# Patient Record
Sex: FEMALE | Race: BLACK OR AFRICAN AMERICAN | NOT HISPANIC OR LATINO | URBAN - METROPOLITAN AREA
[De-identification: names, ages, dates, MRNs, and addresses within clinical notes are randomized per-mention and may not be internally consistent; named-entity substitution may affect disease eponyms.]

---

## 2020-07-25 ENCOUNTER — TELEPHONE ENCOUNTER (OUTPATIENT)
Dept: URBAN - METROPOLITAN AREA CLINIC 13 | Facility: CLINIC | Age: 65
End: 2020-07-25

## 2020-07-25 RX ORDER — POLYETHYLENE GLYCOL 3350, SODIUM CHLORIDE, SODIUM BICARBONATE AND POTASSIUM CHLORIDE WITH LEMON FLAVOR 420; 11.2; 5.72; 1.48 G/4L; G/4L; G/4L; G/4L
USE AS DIRECTED POWDER, FOR SOLUTION ORAL
Qty: 1 | Refills: 0 | OUTPATIENT
Start: 2012-12-28 | End: 2013-01-30

## 2020-07-26 ENCOUNTER — TELEPHONE ENCOUNTER (OUTPATIENT)
Dept: URBAN - METROPOLITAN AREA CLINIC 13 | Facility: CLINIC | Age: 65
End: 2020-07-26

## 2020-07-26 RX ORDER — ASCORBIC ACID 500 MG
TAKE 3 TABLET WEEKLY.PT STATES DOSAGE 500MG TABLET ORAL
Refills: 0 | Status: ACTIVE | COMMUNITY

## 2020-07-26 RX ORDER — NAPROXEN SODIUM 220 MG
TAKE 1 TABLET 3 TIMES DAILY AS NEEDED.PT STATES DOSAGE 225MG TABLET ORAL
Refills: 0 | Status: ACTIVE | COMMUNITY

## 2020-07-26 RX ORDER — MELOXICAM 15 MG/1
TAKE 1 TABLET DAILY TABLET ORAL
Refills: 0 | Status: ACTIVE | COMMUNITY

## 2023-03-13 ENCOUNTER — OFFICE VISIT (OUTPATIENT)
Dept: URBAN - METROPOLITAN AREA CLINIC 113 | Facility: CLINIC | Age: 68
End: 2023-03-13

## 2023-03-13 RX ORDER — MELOXICAM 15 MG/1
TAKE 1 TABLET DAILY TABLET ORAL
Refills: 0 | Status: ACTIVE | COMMUNITY

## 2023-03-13 RX ORDER — NAPROXEN SODIUM 220 MG
TAKE 1 TABLET 3 TIMES DAILY AS NEEDED.PT STATES DOSAGE 225MG TABLET ORAL
Refills: 0 | Status: ACTIVE | COMMUNITY

## 2023-03-13 RX ORDER — ASCORBIC ACID 500 MG
TAKE 3 TABLET WEEKLY.PT STATES DOSAGE 500MG TABLET ORAL
Refills: 0 | Status: ACTIVE | COMMUNITY

## 2023-03-13 NOTE — HPI-TODAY'S VISIT:
67 year old female with a history of hyperlipidemia, migraines, vitamin B12 deficiency, fibromylagia, vitamin D deficiency, GERD presents for colonoscopy consultation.   Labs 9/28/22: Hgb 11.7, normal HCt, noraml MCV, normal plts, normal Hgb A1c, unremarkable CMP, normal TSH, elevated vitamin B12 1247, low folate 3.91, normal iron studies, low vitamin 21.9.  Patient has been followed by Dr. Soria since 2013 for colon cancer screening. Screening colonoscopy in January 2013 revealed excellent bowel prep, normal TI and normal colon. She was recommended to repeat a colonoscopy in 10 years for screening.   Colonoscopy 1/30/2013: performed without difficulty. Quality of bowel prep was excellent. TI was normal. Colon was normal. Repeat in 10 years for screening purposes.

## 2023-04-13 ENCOUNTER — WEB ENCOUNTER (OUTPATIENT)
Dept: URBAN - METROPOLITAN AREA CLINIC 113 | Facility: CLINIC | Age: 68
End: 2023-04-13

## 2023-04-13 ENCOUNTER — DASHBOARD ENCOUNTERS (OUTPATIENT)
Age: 68
End: 2023-04-13

## 2023-04-13 ENCOUNTER — OFFICE VISIT (OUTPATIENT)
Dept: URBAN - METROPOLITAN AREA CLINIC 113 | Facility: CLINIC | Age: 68
End: 2023-04-13
Payer: MEDICARE

## 2023-04-13 VITALS
TEMPERATURE: 97.9 F | HEIGHT: 67 IN | RESPIRATION RATE: 20 BRPM | HEART RATE: 77 BPM | DIASTOLIC BLOOD PRESSURE: 68 MMHG | BODY MASS INDEX: 45.99 KG/M2 | SYSTOLIC BLOOD PRESSURE: 123 MMHG | WEIGHT: 293 LBS

## 2023-04-13 DIAGNOSIS — Z12.11 COLON CANCER SCREENING: ICD-10-CM

## 2023-04-13 PROCEDURE — 99243 OFF/OP CNSLTJ NEW/EST LOW 30: CPT

## 2023-04-13 PROCEDURE — 993 AGA

## 2023-04-13 RX ORDER — PREDNISONE 5 MG/1
1 TABLET TABLET ORAL ONCE A DAY
Status: ACTIVE | COMMUNITY

## 2023-04-13 RX ORDER — NAPROXEN SODIUM 220 MG
TAKE 1 TABLET 3 TIMES DAILY AS NEEDED.PT STATES DOSAGE 225MG TABLET ORAL
Refills: 0 | Status: ON HOLD | COMMUNITY

## 2023-04-13 RX ORDER — MELOXICAM 15 MG/1
TAKE 1 TABLET DAILY TABLET ORAL
Refills: 0 | Status: ON HOLD | COMMUNITY

## 2023-04-13 RX ORDER — IBUPROFEN 800 MG/1
1 TABLET WITH FOOD OR MILK AS NEEDED TABLET, FILM COATED ORAL ONCE DAILY
Status: ACTIVE | COMMUNITY

## 2023-04-13 RX ORDER — ASCORBIC ACID 500 MG
TAKE 3 TABLET WEEKLY.PT STATES DOSAGE 500MG TABLET ORAL
Refills: 0 | Status: ON HOLD | COMMUNITY

## 2023-04-13 NOTE — HPI-OTHER HISTORIES
Labs 9/28/2022:Hemoglobin 11.7, normal hematocrit, normal MCV, normal platelets, hemoglobin A1c 5.5, unremarkable CMP, normal TSH, unremarkable lipid panel, elevated vitamin B12 1247, low folate 3.91, normal iron panel, low vitamin D 21.9, elevated B12 1247, normal thyroid panel. Colonoscopy 1/30/2013:Performed without difficulty.  Excellent bowel prep.  Normal TI and colon.  Repeat in 10 years for screening.

## 2023-04-13 NOTE — HPI-TODAY'S VISIT:
67-year-old female with a history of hyperlipidemia, migraines, vitamin B12 deficiency, fibromyalgia followed by Dr. Payan, vitamin D deficiency, GERD presents for colonoscopy consultation.  She has been referred by Dr. Payton Valenzuela.  A copy of today's visit will be forwarded to the referring provider.   She underwent a screening colonoscopy Dr. Sorai in January 2013.  This revealed excellent bowel prep, normal TI and normal colon.  She was recommended to repeat in 10 years for screening purposes. She did fail direct access questionnaire due to BMI over 45.  Labs 4/3/2023:Hemoglobin A1c 5.3, normal thyroid panel, cholesterol 207, sodium 146, normal LFTs, normal vitamin B12, normal vitamin D 25-hydroxy, unremarkable CBC, unremarkable urinalysis.  She has been going well from a GI standpoint. Bowel movements are regular and without blood per rectum. Denies nausea, vomiting or abdominal pain. Denies heartburn or dysphagia. Denies any changes to medical or surgical history. She does take Prednisone 5 mg and Ibuprofen 800 mg once daily for arthritis. She has been taking this for months-years.

## 2023-04-14 PROBLEM — 305058001: Status: ACTIVE | Noted: 2023-04-14

## 2023-05-17 ENCOUNTER — CLAIMS CREATED FROM THE CLAIM WINDOW (OUTPATIENT)
Dept: URBAN - METROPOLITAN AREA MEDICAL CENTER 2 | Facility: MEDICAL CENTER | Age: 68
End: 2023-05-17

## 2023-05-17 ENCOUNTER — CLAIMS CREATED FROM THE CLAIM WINDOW (OUTPATIENT)
Dept: URBAN - METROPOLITAN AREA MEDICAL CENTER 2 | Facility: MEDICAL CENTER | Age: 68
End: 2023-05-17
Payer: MEDICARE

## 2023-05-17 DIAGNOSIS — Z12.11 COLON CANCER SCREENING: ICD-10-CM

## 2023-05-17 DIAGNOSIS — D12.4 ADENOMA OF DESCENDING COLON: ICD-10-CM

## 2023-05-17 PROCEDURE — 45385 COLONOSCOPY W/LESION REMOVAL: CPT | Performed by: INTERNAL MEDICINE

## 2023-05-17 RX ORDER — MELOXICAM 15 MG/1
TAKE 1 TABLET DAILY TABLET ORAL
Refills: 0 | Status: ON HOLD | COMMUNITY

## 2023-05-17 RX ORDER — ASCORBIC ACID 500 MG
TAKE 3 TABLET WEEKLY.PT STATES DOSAGE 500MG TABLET ORAL
Refills: 0 | Status: ON HOLD | COMMUNITY

## 2023-05-17 RX ORDER — IBUPROFEN 800 MG/1
1 TABLET WITH FOOD OR MILK AS NEEDED TABLET, FILM COATED ORAL ONCE DAILY
Status: ACTIVE | COMMUNITY

## 2023-05-17 RX ORDER — PREDNISONE 5 MG/1
1 TABLET TABLET ORAL ONCE A DAY
Status: ACTIVE | COMMUNITY

## 2023-05-17 RX ORDER — NAPROXEN SODIUM 220 MG
TAKE 1 TABLET 3 TIMES DAILY AS NEEDED.PT STATES DOSAGE 225MG TABLET ORAL
Refills: 0 | Status: ON HOLD | COMMUNITY

## 2025-03-12 ENCOUNTER — TELEPHONE ENCOUNTER (OUTPATIENT)
Dept: URBAN - METROPOLITAN AREA CLINIC 113 | Facility: CLINIC | Age: 70
End: 2025-03-12